# Patient Record
Sex: FEMALE | Race: WHITE | Employment: FULL TIME | URBAN - METROPOLITAN AREA
[De-identification: names, ages, dates, MRNs, and addresses within clinical notes are randomized per-mention and may not be internally consistent; named-entity substitution may affect disease eponyms.]

---

## 2017-06-23 ENCOUNTER — ALLSCRIPTS OFFICE VISIT (OUTPATIENT)
Dept: OTHER | Facility: OTHER | Age: 47
End: 2017-06-23

## 2017-06-23 DIAGNOSIS — Z13.6 ENCOUNTER FOR SCREENING FOR CARDIOVASCULAR DISORDERS: ICD-10-CM

## 2017-06-23 DIAGNOSIS — E04.9 NONTOXIC GOITER: ICD-10-CM

## 2017-06-23 DIAGNOSIS — E55.9 VITAMIN D DEFICIENCY: ICD-10-CM

## 2017-06-26 ENCOUNTER — GENERIC CONVERSION - ENCOUNTER (OUTPATIENT)
Dept: OTHER | Facility: OTHER | Age: 47
End: 2017-06-26

## 2017-06-26 LAB
AMBIG ABBREV CMP14 DEFAULT (HISTORICAL): NORMAL
AMBIG ABBREV LP DEFAULT (HISTORICAL): NORMAL

## 2017-06-27 ENCOUNTER — GENERIC CONVERSION - ENCOUNTER (OUTPATIENT)
Dept: OTHER | Facility: OTHER | Age: 47
End: 2017-06-27

## 2017-06-27 LAB
25(OH)D3 SERPL-MCNC: 50.7 NG/ML (ref 30–100)
A/G RATIO (HISTORICAL): 1.9 (ref 1.2–2.2)
ALBUMIN SERPL BCP-MCNC: 4.4 G/DL (ref 3.5–5.5)
ALP SERPL-CCNC: 42 IU/L (ref 39–117)
ALT SERPL W P-5'-P-CCNC: 17 IU/L (ref 0–32)
AST SERPL W P-5'-P-CCNC: 14 IU/L (ref 0–40)
BASOPHILS # BLD AUTO: 0 %
BASOPHILS # BLD AUTO: 0 X10E3/UL (ref 0–0.2)
BILIRUB SERPL-MCNC: 0.5 MG/DL (ref 0–1.2)
BUN SERPL-MCNC: 12 MG/DL (ref 6–24)
BUN/CREA RATIO (HISTORICAL): 17 (ref 9–23)
CALCIUM SERPL-MCNC: 9.6 MG/DL (ref 8.7–10.2)
CHLORIDE SERPL-SCNC: 100 MMOL/L (ref 96–106)
CHOLEST SERPL-MCNC: 211 MG/DL (ref 100–199)
CO2 SERPL-SCNC: 22 MMOL/L (ref 18–29)
CREAT SERPL-MCNC: 0.69 MG/DL (ref 0.57–1)
DEPRECATED RDW RBC AUTO: 13 % (ref 12.3–15.4)
EGFR AFRICAN AMERICAN (HISTORICAL): 122 ML/MIN/1.73
EGFR-AMERICAN CALC (HISTORICAL): 105 ML/MIN/1.73
EOSINOPHIL # BLD AUTO: 0.5 X10E3/UL (ref 0–0.4)
EOSINOPHIL # BLD AUTO: 5 %
GLUCOSE SERPL-MCNC: 108 MG/DL (ref 65–99)
HCT VFR BLD AUTO: 44.3 % (ref 34–46.6)
HDLC SERPL-MCNC: 69 MG/DL
HGB BLD-MCNC: 15.1 G/DL (ref 11.1–15.9)
IMM.GRANULOCYTES (CD4/8) (HISTORICAL): 0 %
IMM.GRANULOCYTES (CD4/8) (HISTORICAL): 0 X10E3/UL (ref 0–0.1)
LDLC SERPL CALC-MCNC: 123 MG/DL (ref 0–99)
LYMPHOCYTES # BLD AUTO: 2.4 X10E3/UL (ref 0.7–3.1)
LYMPHOCYTES # BLD AUTO: 26 %
MCH RBC QN AUTO: 30.3 PG (ref 26.6–33)
MCHC RBC AUTO-ENTMCNC: 34.1 G/DL (ref 31.5–35.7)
MCV RBC AUTO: 89 FL (ref 79–97)
MONOCYTES # BLD AUTO: 0.8 X10E3/UL (ref 0.1–0.9)
MONOCYTES (HISTORICAL): 8 %
NEUTROPHILS # BLD AUTO: 5.7 X10E3/UL (ref 1.4–7)
NEUTROPHILS # BLD AUTO: 61 %
PLATELET # BLD AUTO: 366 X10E3/UL (ref 150–379)
POTASSIUM SERPL-SCNC: 4.7 MMOL/L (ref 3.5–5.2)
RBC (HISTORICAL): 4.98 X10E6/UL (ref 3.77–5.28)
SODIUM SERPL-SCNC: 139 MMOL/L (ref 134–144)
T4 FREE SERPL-MCNC: 1.28 NG/DL (ref 0.82–1.77)
TOT. GLOBULIN, SERUM (HISTORICAL): 2.3 G/DL (ref 1.5–4.5)
TOTAL PROTEIN (HISTORICAL): 6.7 G/DL (ref 6–8.5)
TRIGL SERPL-MCNC: 97 MG/DL (ref 0–149)
TSH SERPL DL<=0.05 MIU/L-ACNC: 1.77 UIU/ML (ref 0.45–4.5)
VLDLC SERPL CALC-MCNC: 19 MG/DL (ref 5–40)
WBC # BLD AUTO: 9.4 X10E3/UL (ref 3.4–10.8)

## 2018-01-11 NOTE — RESULT NOTES
Verified Results  (1) CBC/PLT/DIFF [id number] 09:48AM Joey Jackie     Test Name Result Flag Reference   WBC 9 4 x10E3/uL  3 4-10 8   RBC 4 98 x10E6/uL  3 77-5 28   Hemoglobin 15 1 g/dL  11 1-15 9   Hematocrit 44 3 %  34 0-46  6   MCV 89 fL  79-97   MCH 30 3 pg  26 6-33 0   MCHC 34 1 g/dL  31 5-35 7   RDW 13 0 %  12 3-15 4   Platelets 336 G01O9/LD  150-379   Neutrophils 61 %     Lymphs 26 %     Monocytes 8 %     Eos 5 %     Basos 0 %     Neutrophils (Absolute) 5 7 x10E3/uL  1 4-7 0   Lymphs (Absolute) 2 4 x10E3/uL  0 7-3 1   Monocytes(Absolute) 0 8 x10E3/uL  0 1-0 9   Eos (Absolute) 0 5 x10E3/uL H 0 0-0 4   Baso (Absolute) 0 0 x10E3/uL  0 0-0 2   Immature Granulocytes 0 %     Immature Grans (Abs) 0 0 x10E3/uL  0 0-0 1     (1) COMPREHENSIVE METABOLIC PANEL [id number] 52:53PC Joey Jackie     Test Name Result Flag Reference   Glucose, Serum 108 mg/dL H 65-99   BUN 12 mg/dL  6-24   Creatinine, Serum 0 69 mg/dL  0 57-1 00   BUN/Creatinine Ratio 17  9-23   Sodium, Serum 139 mmol/L  134-144   Potassium, Serum 4 7 mmol/L  3 5-5 2   Chloride, Serum 100 mmol/L     Carbon Dioxide, Total 22 mmol/L  18-29   Calcium, Serum 9 6 mg/dL  8 7-10 2   Protein, Total, Serum 6 7 g/dL  6 0-8 5   Albumin, Serum 4 4 g/dL  3 5-5 5   Globulin, Total 2 3 g/dL  1 5-4 5   A/G Ratio 1 9  1 2-2 2   Bilirubin, Total 0 5 mg/dL  0 0-1 2   Alkaline Phosphatase, S 42 IU/L     AST (SGOT) 14 IU/L  0-40   ALT (SGPT) 17 IU/L  0-32   eGFR If NonAfricn Am 105 mL/min/1 73  >59   eGFR If Africn Am 122 mL/min/1 73  >59     (LC) Lipid Panel [id number] 09:48AM Joey Mejia     Test Name Result Flag Reference   Cholesterol, Total 211 mg/dL H 100-199   Triglycerides 97 mg/dL  0-149   HDL Cholesterol 69 mg/dL  >39   VLDL Cholesterol Ajit 19 mg/dL  5-40   LDL Cholesterol Calc 123 mg/dL H 0-99     (LC) Thyroxine (T4) Free, Direct, S [id number] 09:48AM Joey Mejia     Test Name Result Flag Reference   T4,Free(Direct) 1 28 ng/dL  0 82-1 77     (1) TSH [id number] 09:48AM Janis Dietz     Test Name Result Flag Reference   TSH 1 770 uIU/mL  0 450-4 500     (1) VITAMIN D 25-HYDROXY [id number] 09:48AM Janis Dietz     Test Name Result Flag Reference   Vitamin D, 25-Hydroxy 50 7 ng/mL  30 0-100 0   Vitamin D deficiency has been defined by the [address] practice guideline as a  level of serum 25-OH vitamin D less than 20 ng/mL (1,2)  The Endocrine Society went on to further define vitamin D  insufficiency as a level between 21 and 29 ng/mL (2)  1  IOM ([place] [employer])  2010  Dietary reference     intakes for calcium and D  [facility]: The     [employer]  2  Hermelinda MF, Michele DANIELSON, Li HALE, et al      Evaluation, treatment, and prevention of vitamin D     deficiency: an Endocrine Society clinical practice     guideline  JC  2011 Jul; 96(7):1911-30  [facility] Burk[facility] [id number] Default [id number] 09:48AM Janis Dietz     Test Name Result Flag Reference   Hope Clines CMP14 Default Comment     A hand-written panel/profile was received from your office  In  accordance with the [facility] Ambiguous Test Code Policy dated July 9818, we have completed your order by using the closest currently  or formerly recognized AMA panel  We have assigned Comprehensive  Metabolic Panel (14), Test Code #[id number] to this request   If this  is not the testing you wished to receive on this specimen, please  contact the [address] [place] [address] Client Inquiry/Technical Services Department  to clarify the test order  We appreciate your business  [facility]) Hope Clines LP Default [id number] 09:48AM Janis Dietz     Test Name Result Flag Reference   Ambig Abbrev LP Default Comment     A hand-written panel/profile was received from your office  In  accordance with the [facility] Ambiguous Test Code Policy dated July 1480, we have completed your order by using the closest currently  or formerly recognized AMA panel    We have assigned Lipid Panel,  Test Code #[id number] to this request  If this is not the testing you  wished to receive on this specimen, please contact the [address] [place] [address]  Client Inquiry/Technical Services Department to clarify the test  order  We appreciate your business

## 2018-01-14 VITALS
SYSTOLIC BLOOD PRESSURE: 120 MMHG | HEART RATE: 63 BPM | DIASTOLIC BLOOD PRESSURE: 80 MMHG | OXYGEN SATURATION: 97 % | WEIGHT: 160 LBS | HEIGHT: 63 IN | BODY MASS INDEX: 28.35 KG/M2